# Patient Record
Sex: FEMALE | URBAN - METROPOLITAN AREA
[De-identification: names, ages, dates, MRNs, and addresses within clinical notes are randomized per-mention and may not be internally consistent; named-entity substitution may affect disease eponyms.]

---

## 2021-10-04 ENCOUNTER — APPOINTMENT (OUTPATIENT)
Dept: URBAN - METROPOLITAN AREA CLINIC 265 | Age: 46
Setting detail: DERMATOLOGY
End: 2021-10-24

## 2021-10-04 VITALS — RESPIRATION RATE: 18 BRPM | HEIGHT: 62 IN | WEIGHT: 117 LBS

## 2021-10-04 DIAGNOSIS — L30.9 DERMATITIS, UNSPECIFIED: ICD-10-CM

## 2021-10-04 PROCEDURE — OTHER MIPS QUALITY: OTHER

## 2021-10-04 PROCEDURE — OTHER BIOPSY BY PUNCH METHOD: OTHER

## 2021-10-04 PROCEDURE — OTHER PRESCRIPTION MEDICATION MANAGEMENT: OTHER

## 2021-10-04 PROCEDURE — 11104 PUNCH BX SKIN SINGLE LESION: CPT

## 2021-10-04 PROCEDURE — OTHER PRESCRIPTION: OTHER

## 2021-10-04 PROCEDURE — OTHER COUNSELING: OTHER

## 2021-10-04 RX ORDER — KETOCONAZOLE 20 MG/G
CREAM TOPICAL
Qty: 60 | Refills: 3 | Status: ERX | COMMUNITY
Start: 2021-10-04

## 2021-10-04 ASSESSMENT — LOCATION DETAILED DESCRIPTION DERM
LOCATION DETAILED: LEFT PROXIMAL DORSAL FOREARM
LOCATION DETAILED: PERIUMBILICAL SKIN
LOCATION DETAILED: RIGHT INFERIOR UPPER BACK
LOCATION DETAILED: RIGHT INFERIOR LATERAL UPPER BACK
LOCATION DETAILED: RIGHT PROXIMAL DORSAL FOREARM

## 2021-10-04 ASSESSMENT — LOCATION SIMPLE DESCRIPTION DERM
LOCATION SIMPLE: RIGHT FOREARM
LOCATION SIMPLE: RIGHT UPPER BACK
LOCATION SIMPLE: ABDOMEN
LOCATION SIMPLE: LEFT FOREARM

## 2021-10-04 ASSESSMENT — LOCATION ZONE DERM
LOCATION ZONE: ARM
LOCATION ZONE: TRUNK

## 2021-10-04 NOTE — PROCEDURE: BIOPSY BY PUNCH METHOD
Punch Size In Mm: 4
Anesthesia Volume In Cc (Will Not Render If 0): 0.5
Detail Level: Detailed
Wound Care: Petrolatum
Validate Anticipated Plan: No
Size Of Lesion In Cm (Optional): 0
Epidermal Sutures: 4-0 Nylon
Consent: Written consent was obtained and risks were reviewed including but not limited to scarring, infection, bleeding, scabbing, incomplete removal, nerve damage and allergy to anesthesia.
Notification Instructions: Patient will be notified of biopsy results. However, patient instructed to call the office if not contacted within 2 weeks.
Suture Removal: 7 days
Hemostasis: None
Validate Note Data (See Information Below): Yes
Biopsy Type: H and E
Anesthesia Type: 1% lidocaine with epinephrine
Dressing: bandage
Home Suture Removal Text: Patient was provided a home suture removal kit and will remove their sutures at home.  If they have any questions or difficulties they will call the office.
Billing Type: Third-Party Bill
Information: Selecting Yes will display possible errors in your note based on the variables you have selected. This validation is only offered as a suggestion for you. PLEASE NOTE THAT THE VALIDATION TEXT WILL BE REMOVED WHEN YOU FINALIZE YOUR NOTE. IF YOU WANT TO FAX A PRELIMINARY NOTE YOU WILL NEED TO TOGGLE THIS TO 'NO' IF YOU DO NOT WANT IT IN YOUR FAXED NOTE.
Post-Care Instructions: I reviewed with the patient in detail post-care instructions. Patient is to keep the biopsy site dry overnight, and then apply bacitracin twice daily until healed. Patient may apply hydrogen peroxide soaks to remove any crusting.

## 2021-10-04 NOTE — PROCEDURE: PRESCRIPTION MEDICATION MANAGEMENT
Render In Strict Bullet Format?: No
Plan: Re-evaluate pending pathology results.
Detail Level: Zone
Initiate Treatment: ketoconazole 2 % topical cream, apply to affected areas twice a day until resolved, then 1 week past clearance

## 2021-10-04 NOTE — HPI: RASH
What Type Of Note Output Would You Prefer (Optional)?: Bullet Format
How Severe Is Your Rash?: moderate
Is This A New Presentation, Or A Follow-Up?: Rash
Additional History: Patient was given terbinafin in the past and it has not helped.

## 2021-10-20 ENCOUNTER — RX ONLY (RX ONLY)
Age: 46
End: 2021-10-20

## 2021-10-20 RX ORDER — FLUCONAZOLE 200 MG/1
TABLET ORAL
Qty: 2 | Refills: 0 | Status: ERX | COMMUNITY
Start: 2021-10-20

## 2021-12-01 ENCOUNTER — APPOINTMENT (OUTPATIENT)
Dept: URBAN - METROPOLITAN AREA CLINIC 265 | Age: 46
Setting detail: DERMATOLOGY
End: 2021-12-01

## 2021-12-01 DIAGNOSIS — B35.4 TINEA CORPORIS: ICD-10-CM

## 2021-12-01 PROCEDURE — OTHER PRESCRIPTION MEDICATION MANAGEMENT: OTHER

## 2021-12-01 PROCEDURE — 99213 OFFICE O/P EST LOW 20 MIN: CPT

## 2021-12-01 PROCEDURE — OTHER COUNSELING: OTHER

## 2021-12-01 ASSESSMENT — LOCATION DETAILED DESCRIPTION DERM
LOCATION DETAILED: RIGHT RIB CAGE
LOCATION DETAILED: LEFT VENTRAL PROXIMAL FOREARM
LOCATION DETAILED: LEFT RIB CAGE
LOCATION DETAILED: RIGHT VENTRAL PROXIMAL FOREARM
LOCATION DETAILED: INFERIOR THORACIC SPINE

## 2021-12-01 ASSESSMENT — LOCATION SIMPLE DESCRIPTION DERM
LOCATION SIMPLE: UPPER BACK
LOCATION SIMPLE: ABDOMEN
LOCATION SIMPLE: LEFT FOREARM
LOCATION SIMPLE: RIGHT FOREARM

## 2021-12-01 ASSESSMENT — LOCATION ZONE DERM
LOCATION ZONE: TRUNK
LOCATION ZONE: ARM

## 2021-12-01 NOTE — PROCEDURE: PRESCRIPTION MEDICATION MANAGEMENT
Render In Strict Bullet Format?: No
Detail Level: Zone
Discontinue Regimen: ketoconazole 2 % topical cream, apply to affected areas twice a day until resolved, then 1 week past clearance
Plan: Will re-evaluate as needed